# Patient Record
Sex: MALE | Race: WHITE | NOT HISPANIC OR LATINO | URBAN - METROPOLITAN AREA
[De-identification: names, ages, dates, MRNs, and addresses within clinical notes are randomized per-mention and may not be internally consistent; named-entity substitution may affect disease eponyms.]

---

## 2020-09-09 ENCOUNTER — OFFICE (OUTPATIENT)
Dept: URBAN - METROPOLITAN AREA CLINIC 11 | Facility: CLINIC | Age: 45
End: 2020-09-09

## 2020-09-09 VITALS
HEART RATE: 84 BPM | HEIGHT: 67 IN | WEIGHT: 171 LBS | SYSTOLIC BLOOD PRESSURE: 128 MMHG | DIASTOLIC BLOOD PRESSURE: 76 MMHG | OXYGEN SATURATION: 99 %

## 2020-09-09 DIAGNOSIS — K76.0 FATTY (CHANGE OF) LIVER, NOT ELSEWHERE CLASSIFIED: ICD-10-CM

## 2020-09-09 DIAGNOSIS — R10.11 RIGHT UPPER QUADRANT PAIN: ICD-10-CM

## 2020-09-09 DIAGNOSIS — K80.20 CALCULUS OF GALLBLADDER WITHOUT CHOLECYSTITIS WITHOUT OBSTRU: ICD-10-CM

## 2020-09-09 LAB
HEPATIC FUNCTION PANEL (7): ALBUMIN: 4.2 G/DL (ref 4–5)
HEPATIC FUNCTION PANEL (7): ALKALINE PHOSPHATASE: 97 IU/L (ref 39–117)
HEPATIC FUNCTION PANEL (7): ALT (SGPT): 21 IU/L (ref 0–44)
HEPATIC FUNCTION PANEL (7): AST (SGOT): 21 IU/L (ref 0–40)
HEPATIC FUNCTION PANEL (7): BILIRUBIN, DIRECT: 0.28 MG/DL (ref 0–0.4)
HEPATIC FUNCTION PANEL (7): BILIRUBIN, TOTAL: 0.7 MG/DL (ref 0–1.2)
HEPATIC FUNCTION PANEL (7): PROTEIN, TOTAL: 7.4 G/DL (ref 6–8.5)

## 2020-09-09 PROCEDURE — 99204 OFFICE O/P NEW MOD 45 MIN: CPT | Performed by: INTERNAL MEDICINE

## 2020-11-05 ENCOUNTER — OFFICE (OUTPATIENT)
Dept: URBAN - METROPOLITAN AREA CLINIC 11 | Facility: CLINIC | Age: 45
End: 2020-11-05
Payer: COMMERCIAL

## 2020-11-05 VITALS
HEIGHT: 67 IN | OXYGEN SATURATION: 98 % | WEIGHT: 169 LBS | DIASTOLIC BLOOD PRESSURE: 91 MMHG | HEART RATE: 89 BPM | SYSTOLIC BLOOD PRESSURE: 137 MMHG

## 2020-11-05 DIAGNOSIS — K76.0 FATTY (CHANGE OF) LIVER, NOT ELSEWHERE CLASSIFIED: ICD-10-CM

## 2020-11-05 PROCEDURE — 99213 OFFICE O/P EST LOW 20 MIN: CPT | Performed by: INTERNAL MEDICINE

## 2022-05-06 ENCOUNTER — OFFICE (OUTPATIENT)
Dept: URBAN - METROPOLITAN AREA PATHOLOGY 22 | Facility: PATHOLOGY | Age: 47
End: 2022-05-06
Payer: COMMERCIAL

## 2022-05-06 ENCOUNTER — AMBULATORY SURGICAL CENTER (OUTPATIENT)
Dept: URBAN - METROPOLITAN AREA SURGERY 3 | Facility: SURGERY | Age: 47
End: 2022-05-06
Payer: COMMERCIAL

## 2022-05-06 VITALS
SYSTOLIC BLOOD PRESSURE: 105 MMHG | TEMPERATURE: 98.1 F | SYSTOLIC BLOOD PRESSURE: 137 MMHG | RESPIRATION RATE: 14 BRPM | HEIGHT: 69 IN | OXYGEN SATURATION: 98 % | RESPIRATION RATE: 18 BRPM | RESPIRATION RATE: 18 BRPM | DIASTOLIC BLOOD PRESSURE: 79 MMHG | SYSTOLIC BLOOD PRESSURE: 109 MMHG | TEMPERATURE: 98.3 F | WEIGHT: 175 LBS | SYSTOLIC BLOOD PRESSURE: 109 MMHG | OXYGEN SATURATION: 98 % | SYSTOLIC BLOOD PRESSURE: 109 MMHG | HEART RATE: 72 BPM | OXYGEN SATURATION: 96 % | DIASTOLIC BLOOD PRESSURE: 81 MMHG | RESPIRATION RATE: 20 BRPM | DIASTOLIC BLOOD PRESSURE: 79 MMHG | HEART RATE: 71 BPM | SYSTOLIC BLOOD PRESSURE: 129 MMHG | WEIGHT: 175 LBS | HEART RATE: 71 BPM | HEIGHT: 69 IN | DIASTOLIC BLOOD PRESSURE: 86 MMHG | DIASTOLIC BLOOD PRESSURE: 86 MMHG | SYSTOLIC BLOOD PRESSURE: 137 MMHG | DIASTOLIC BLOOD PRESSURE: 62 MMHG | HEART RATE: 70 BPM | SYSTOLIC BLOOD PRESSURE: 129 MMHG | HEART RATE: 70 BPM | SYSTOLIC BLOOD PRESSURE: 121 MMHG | DIASTOLIC BLOOD PRESSURE: 81 MMHG | OXYGEN SATURATION: 97 % | DIASTOLIC BLOOD PRESSURE: 73 MMHG | HEART RATE: 72 BPM | DIASTOLIC BLOOD PRESSURE: 79 MMHG | RESPIRATION RATE: 20 BRPM | TEMPERATURE: 98.1 F | RESPIRATION RATE: 14 BRPM | OXYGEN SATURATION: 98 % | HEART RATE: 72 BPM | HEART RATE: 78 BPM | RESPIRATION RATE: 14 BRPM | TEMPERATURE: 98.3 F | HEART RATE: 78 BPM | SYSTOLIC BLOOD PRESSURE: 137 MMHG | RESPIRATION RATE: 18 BRPM | DIASTOLIC BLOOD PRESSURE: 62 MMHG | OXYGEN SATURATION: 96 % | OXYGEN SATURATION: 97 % | DIASTOLIC BLOOD PRESSURE: 73 MMHG | HEART RATE: 70 BPM | SYSTOLIC BLOOD PRESSURE: 105 MMHG | DIASTOLIC BLOOD PRESSURE: 73 MMHG | SYSTOLIC BLOOD PRESSURE: 121 MMHG | SYSTOLIC BLOOD PRESSURE: 129 MMHG | HEIGHT: 69 IN | DIASTOLIC BLOOD PRESSURE: 86 MMHG | OXYGEN SATURATION: 97 % | TEMPERATURE: 98.3 F | RESPIRATION RATE: 20 BRPM | OXYGEN SATURATION: 96 % | HEART RATE: 78 BPM | HEART RATE: 71 BPM | WEIGHT: 175 LBS | TEMPERATURE: 98.1 F | SYSTOLIC BLOOD PRESSURE: 105 MMHG | DIASTOLIC BLOOD PRESSURE: 81 MMHG | SYSTOLIC BLOOD PRESSURE: 121 MMHG | DIASTOLIC BLOOD PRESSURE: 62 MMHG

## 2022-05-06 DIAGNOSIS — Z12.11 ENCOUNTER FOR SCREENING FOR MALIGNANT NEOPLASM OF COLON: ICD-10-CM

## 2022-05-06 DIAGNOSIS — D12.0 BENIGN NEOPLASM OF CECUM: ICD-10-CM

## 2022-05-06 PROBLEM — K63.5 POLYP OF COLON: Status: ACTIVE | Noted: 2022-05-06

## 2022-05-06 PROCEDURE — 45385 COLONOSCOPY W/LESION REMOVAL: CPT | Mod: 33 | Performed by: INTERNAL MEDICINE

## 2022-05-06 PROCEDURE — 88305 TISSUE EXAM BY PATHOLOGIST: CPT | Performed by: PATHOLOGY

## 2022-05-09 ENCOUNTER — OFFICE (OUTPATIENT)
Dept: URBAN - METROPOLITAN AREA CLINIC 11 | Facility: CLINIC | Age: 47
End: 2022-05-09

## 2022-05-09 VITALS
HEIGHT: 69 IN | HEART RATE: 93 BPM | SYSTOLIC BLOOD PRESSURE: 145 MMHG | OXYGEN SATURATION: 100 % | WEIGHT: 183 LBS | DIASTOLIC BLOOD PRESSURE: 951 MMHG

## 2022-05-09 DIAGNOSIS — K62.5 HEMORRHAGE OF ANUS AND RECTUM: ICD-10-CM

## 2022-05-09 LAB
CBC WITH DIFFERENTIAL/PLATELET: BASO (ABSOLUTE): 0.1 THOU/MCL (ref 0–0.1)
CBC WITH DIFFERENTIAL/PLATELET: BASOS: 0.5 %
CBC WITH DIFFERENTIAL/PLATELET: EOS (ABSOLUTE): 0 THOU/MCL (ref 0–0.4)
CBC WITH DIFFERENTIAL/PLATELET: EOS: 0.2 %
CBC WITH DIFFERENTIAL/PLATELET: HEMATOCRIT: 32.1 % — LOW (ref 38.8–48.1)
CBC WITH DIFFERENTIAL/PLATELET: HEMATOLOGY COMMENTS: (no result)
CBC WITH DIFFERENTIAL/PLATELET: HEMOGLOBIN: 11.8 G/DL — LOW (ref 12.8–16.4)
CBC WITH DIFFERENTIAL/PLATELET: IMMATURE CELLS: (no result)
CBC WITH DIFFERENTIAL/PLATELET: IMMATURE GRANS (ABS): (no result)
CBC WITH DIFFERENTIAL/PLATELET: IMMATURE GRANULOCYTES: (no result)
CBC WITH DIFFERENTIAL/PLATELET: LYMPHS (ABSOLUTE): 3 THOU/MCL (ref 1.1–3.6)
CBC WITH DIFFERENTIAL/PLATELET: LYMPHS: 21 %
CBC WITH DIFFERENTIAL/PLATELET: MCH: 36.3 PG — HIGH (ref 26.3–33.2)
CBC WITH DIFFERENTIAL/PLATELET: MCHC: 35.3 G/DL — HIGH (ref 31.8–34.9)
CBC WITH DIFFERENTIAL/PLATELET: MCV: 101 FL (ref 78.4–101.7)
CBC WITH DIFFERENTIAL/PLATELET: MONOCYTES(ABSOLUTE): 0.9 THOU/MCL (ref 0.3–1)
CBC WITH DIFFERENTIAL/PLATELET: MONOCYTES: 6.2 %
CBC WITH DIFFERENTIAL/PLATELET: NEUTROPHILS (ABSOLUTE): 10.1 THOU/MCL — HIGH (ref 1.8–7.1)
CBC WITH DIFFERENTIAL/PLATELET: NEUTROPHILS: 72.1 %
CBC WITH DIFFERENTIAL/PLATELET: NRBC: 0.1 /100 WBC (ref 0–1)
CBC WITH DIFFERENTIAL/PLATELET: PLATELETS: 190 THOU/MCL (ref 150–400)
CBC WITH DIFFERENTIAL/PLATELET: RBC: 3.18 X10E6/UL — LOW (ref 4.2–5.6)
CBC WITH DIFFERENTIAL/PLATELET: RDW: 14.5 % (ref 12.4–16.6)
CBC WITH DIFFERENTIAL/PLATELET: WBC: 14.1 THOU/MCL — HIGH (ref 4.2–10.2)

## 2022-05-09 PROCEDURE — 99214 OFFICE O/P EST MOD 30 MIN: CPT

## 2022-05-09 NOTE — SERVICEHPINOTES
Armando Davis   is a   47   year old  male   here today with complaints of rectal bleeding following colonoscopy with polypectomy by Dr. Napier on 5/6/22. The day following the procedure, he reports having no issues or complaints he tolerated meals well. Yesterday, two days post-colonoscopy, he experienced 3 to 4 episodes of diarrhea with bright red blood that filled the toilet bowl. He took Imodium, as advised by the on-call physician, which stopped his diarrhea. He continued to tolerate meals well. Today, he has had one bowel movement that was more solid with some bright red blood. He reports that the amount of blood was much less than it was yesterday. He denies any abdominal pain, nausea, vomiting, lightheadedness, dizziness, fever, or chills. He does not currently take any blood thinners or aspirin. br

## 2022-05-09 NOTE — SERVICENOTES
Mr. Davis is in no acute distress. Will obtain stat CBC. Discussed ED precautions with the patient. He verbalized a complete understanding & agreement with the plan. 3